# Patient Record
Sex: MALE | Race: WHITE | NOT HISPANIC OR LATINO | Employment: FULL TIME | URBAN - METROPOLITAN AREA
[De-identification: names, ages, dates, MRNs, and addresses within clinical notes are randomized per-mention and may not be internally consistent; named-entity substitution may affect disease eponyms.]

---

## 2024-11-04 ENCOUNTER — OFFICE VISIT (OUTPATIENT)
Dept: ORTHOPEDIC SURGERY | Facility: HOSPITAL | Age: 29
End: 2024-11-04
Payer: COMMERCIAL

## 2024-11-04 ENCOUNTER — HOSPITAL ENCOUNTER (OUTPATIENT)
Dept: RADIOLOGY | Facility: HOSPITAL | Age: 29
Discharge: HOME | End: 2024-11-04
Payer: COMMERCIAL

## 2024-11-04 VITALS — BODY MASS INDEX: 29.82 KG/M2 | HEIGHT: 73 IN | WEIGHT: 225 LBS

## 2024-11-04 DIAGNOSIS — S76.311A HAMSTRING STRAIN, RIGHT, INITIAL ENCOUNTER: ICD-10-CM

## 2024-11-04 DIAGNOSIS — M25.561 RIGHT KNEE PAIN, UNSPECIFIED CHRONICITY: ICD-10-CM

## 2024-11-04 PROCEDURE — 1036F TOBACCO NON-USER: CPT | Performed by: FAMILY MEDICINE

## 2024-11-04 PROCEDURE — 99203 OFFICE O/P NEW LOW 30 MIN: CPT | Performed by: FAMILY MEDICINE

## 2024-11-04 PROCEDURE — 73564 X-RAY EXAM KNEE 4 OR MORE: CPT | Mod: RT

## 2024-11-04 PROCEDURE — 3008F BODY MASS INDEX DOCD: CPT | Performed by: FAMILY MEDICINE

## 2024-11-04 PROCEDURE — 99213 OFFICE O/P EST LOW 20 MIN: CPT | Performed by: FAMILY MEDICINE

## 2024-11-04 PROCEDURE — 73564 X-RAY EXAM KNEE 4 OR MORE: CPT | Mod: RIGHT SIDE | Performed by: RADIOLOGY

## 2024-11-04 ASSESSMENT — PAIN - FUNCTIONAL ASSESSMENT: PAIN_FUNCTIONAL_ASSESSMENT: 0-10

## 2024-11-04 ASSESSMENT — PAIN SCALES - GENERAL: PAINLEVEL_OUTOF10: 6

## 2024-11-04 NOTE — PATIENT INSTRUCTIONS
Please use these exercises to assist your recovery:  [email or web address]

## 2024-11-04 NOTE — PROGRESS NOTES
** Please excuse any errors in grammar or translation related to this dictation. Voice recognition software was utilized to prepare this document. **    Assessment & Plan:    Dx: Right distal hamstring strain    Discussed options for management of this condition and made shared decision making for the selected treatment listed below:  - Today's treatment plan: OTC NSAID as needed for pain. Abstain from strenuous activity (running, jogging, lower body lifting) for next 1-2 weeks until pain resolves.   - Work/exercise limits: Minimizing heavy weight squats/lunges.  Decreased activity over next 1 to 2 weeks, then all activities as tolerated.   - Follow-up: If condition not improving over next 2 weeks, given referral to PT.  Follow-up as needed if condition worsening or recovery plateaus after 4 weeks.     All questions answered and patient is agreeable to plan of care.    Chief complaint: Right posterior knee pain    HPI:  29-year-old male seen in injury clinic for right posterior knee pain that started yesterday when he was running, decelerating, and felt a sudden pop/discomfort in his posterior knee.  He denies any effusion.  He denies any history of knee injuries. Ambulating without discomfort.  No pain with weightbearing.    There is no problem list on file for this patient.    No past surgical history on file.  No current outpatient medications on file prior to visit.     No current facility-administered medications on file prior to visit.       Exam:  General Exam:  Constitutional - NAD, AAO x 3, conversing appropriately.  HEENT- Normocephalic and atraumatic. No facial deformities. Hearing grossly normal.  Lungs - Breathing non-labored with normal rate. No accessory muscle use.  CV - Extremities warm and well-perfused, brisk capillary refill present.   Neuro - CN II-XII grossly intact.    RIGHT Knee Exam:  Normal gait  No effusion, ecchymosis, erythema, warmth  Small 1cm  sized ecchymosis over distal medial  hamstring  NTTP over medial joint line, lateral joint line, patella, quad tendon, patellar tendon, MCL, LCL, popliteal fossa  AROM from 0 to 130 deg with 5/5 strength  ACL: [-]Lachman  PCL:  [-]Posterior drawer  MCL: No laxity or pain with valgus stress at 0 or 30 deg  LCL: No laxity or pain with varus stress at 0 or 30 deg  MENISCAL SIGNS: [-]Ari´s pain or clicking  Point tenderness over distal hamstring, when prone and knee at 90 degrees, pain replicated by resisted knee flexion/hamstring activation    Results:  Xrays of right knee obtained on 11/4 reviewed and independently interpreted as:   No acute fracture or osseous abnormality    Kaiden Frias MD PGY-4  Primary Care Sports Medicine Fellow  [facility] [place]  [facility]